# Patient Record
Sex: MALE | Race: WHITE | NOT HISPANIC OR LATINO | Employment: FULL TIME | URBAN - NONMETROPOLITAN AREA
[De-identification: names, ages, dates, MRNs, and addresses within clinical notes are randomized per-mention and may not be internally consistent; named-entity substitution may affect disease eponyms.]

---

## 2018-11-14 PROBLEM — N49.2 ABSCESS OF SCROTUM: Status: ACTIVE | Noted: 2018-11-14

## 2018-11-15 ENCOUNTER — OFFICE VISIT (OUTPATIENT)
Dept: NEUROSURGERY | Facility: CLINIC | Age: 38
End: 2018-11-15

## 2018-11-15 VITALS
RESPIRATION RATE: 20 BRPM | OXYGEN SATURATION: 98 % | HEART RATE: 76 BPM | SYSTOLIC BLOOD PRESSURE: 124 MMHG | BODY MASS INDEX: 25.05 KG/M2 | WEIGHT: 175 LBS | HEIGHT: 70 IN | DIASTOLIC BLOOD PRESSURE: 75 MMHG

## 2018-11-15 DIAGNOSIS — M50.30 DEGENERATIVE DISC DISEASE, CERVICAL: Primary | ICD-10-CM

## 2018-11-15 PROCEDURE — 99243 OFF/OP CNSLTJ NEW/EST LOW 30: CPT | Performed by: NEUROLOGICAL SURGERY

## 2018-11-15 NOTE — PROGRESS NOTES
Steve Arvind  1980  [id number]      Chief Complaint   Patient presents with   • Neck Pain   • Shoulder Pain       HISTORY OF PRESENT ILLNESS:  This is a 38-year-old man who presents with a history of intermittent neck and right arm pain.  He has a genetic predisposition for degenerative disc disease.  This occurred 2 occasion with some radiculopathy and numbness are moist last a few minutes and resolved.  He is continued to work.  Cervical MRI was performed is referred for neurosurgical consultation.     History reviewed. No pertinent past medical history.    Past Surgical History:   Procedure Laterality Date   • TONSILLECTOMY      [facility]   • TONSILLECTOMY         Family History   Problem Relation Age of Onset   • Breast cancer Mother    • Melanoma Mother    • Diabetes Mother    • Hyperlipidemia Father    • Hypertension Father        Social History     Socioeconomic History   • Marital status:      Spouse name: Not on file   • Number of children: Not on file   • Years of education: Not on file   • Highest education level: Not on file   Social Needs   • Financial resource strain: Not on file   • Food insecurity - worry: Not on file   • Food insecurity - inability: Not on file   • Transportation needs - medical: Not on file   • Transportation needs - non-medical: Not on file   Occupational History   • Not on file   Tobacco Use   • Smoking status: Never Smoker   • Smokeless tobacco: Never Used   Substance and Sexual Activity   • Alcohol use: Defer   • Drug use: No   • Sexual activity: Defer   Other Topics Concern   • Not on file   Social History Narrative   • Not on file       No Known Allergies    No current outpatient medications on file.    Review of Systems   Musculoskeletal: Positive for neck stiffness.   Psychiatric/Behavioral: The patient is nervous/anxious.    All other systems reviewed and are negative.      Vitals:    11/15/18 1242   BP: 124/75   Pulse: 76   Resp: 20   SpO2: 98%   Weight:  "79.4 kg (175 lb)   Height: 177.8 cm (70\")       Neurological Examination: Mental status/speech: The patient is alert and oriented.  Speech is clear without aphysia or dysarthria.  No overt cognitive deficits.    Cranial nerve examination:    Olfaction: Smell is intact.  Vision: Vision is intact without visual field abnormalities.  Funduscopic examination is normal.  No pupillary irregularity.  Ocular motor examination: The extraocular muscles are intact.  There is no diplopia.  The pupil is round and reactive to both light and accommodation.  There is no nystagmus.  Facial movement/sensation: There is no facial weakness.  Sensation is intact in the first, second, and third divisions of the trigeminal nerve.  The corneal reflex is intact.  Auditory: Hearing is intact to finger rub bilaterally.  Cranial nerves IX, X, XI, XII: Phonation is normal.  No dysphagia.  Tongue is protruded in the midline without atrophy.  The gag reflex is intact.  Shoulder shrug is normal.    Musculoligamentous ligamentous examination: He has full and active range of motion cervical spine.  I find no evidence of weakness sensory loss or reflex asymmetry.  His gait is normal.            Medical Decision Making:     Diagnostic Data Set:  Cervical MRI shows the presence of degenerative disc disease C4 5, C5 6, and C6 7.  At C6 7 he  has a bulge of intravertebral disc protruded slightly toward the right providing anatomical/clinical correlation.      Assessment: Symptomatic degenerative disc disease disease          Recommendations:  Surgical intervention is not indication or warranted at this time.  He needs to go to physical therapy to be educated in appropriate exercises, etc.  I would be happy to see me should he have an exacerbation of his symptoms.         I greatly appreciate the opportunity to see and evaluate this individual.  If you have questions or concerns regarding issues that I may have overlooked please call me at any time: " [phone].  Cory Mayen M.D.  [facility]  [address].  [place] [id number]

## 2021-01-21 ENCOUNTER — OFFICE VISIT (OUTPATIENT)
Dept: NEUROSURGERY | Facility: CLINIC | Age: 41
End: 2021-01-21

## 2021-01-21 VITALS
HEIGHT: 70 IN | TEMPERATURE: 98.1 F | SYSTOLIC BLOOD PRESSURE: 108 MMHG | WEIGHT: 173 LBS | BODY MASS INDEX: 24.77 KG/M2 | DIASTOLIC BLOOD PRESSURE: 78 MMHG

## 2021-01-21 DIAGNOSIS — M50.30 DEGENERATIVE DISC DISEASE, CERVICAL: ICD-10-CM

## 2021-01-21 DIAGNOSIS — M51.36 DEGENERATIVE DISC DISEASE, LUMBAR: ICD-10-CM

## 2021-01-21 DIAGNOSIS — M51.36 ANNULAR TEAR OF LUMBAR DISC: ICD-10-CM

## 2021-01-21 DIAGNOSIS — S13.4XXA WHIPLASH INJURIES, INITIAL ENCOUNTER: Primary | ICD-10-CM

## 2021-01-21 PROCEDURE — 99213 OFFICE O/P EST LOW 20 MIN: CPT | Performed by: NEUROLOGICAL SURGERY

## 2021-01-21 RX ORDER — NABUMETONE 750 MG/1
750 TABLET, FILM COATED ORAL 2 TIMES DAILY
Qty: 30 TABLET | Refills: 1 | Status: SHIPPED | OUTPATIENT
Start: 2021-01-21
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
## 2021-01-27 ENCOUNTER — TELEPHONE (OUTPATIENT)
Dept: NEUROSURGERY | Facility: CLINIC | Age: 41
End: 2021-01-27

## 2021-01-27 NOTE — TELEPHONE ENCOUNTER
Provider: Faheem   Caller: Ankush mckeon  Time of call:  11:10   Phone #: [phone]   Surgery:  no  Surgery Date:    Last visit:   01-21-21  Next visit: none    JAVIER:         Reason for call:     Workman's comp has questions about patient's last visit and is also requesting records.    She did not leave a fax number.